# Patient Record
Sex: MALE | Race: WHITE
[De-identification: names, ages, dates, MRNs, and addresses within clinical notes are randomized per-mention and may not be internally consistent; named-entity substitution may affect disease eponyms.]

---

## 2020-02-10 ENCOUNTER — NURSE TRIAGE (OUTPATIENT)
Dept: OTHER | Facility: CLINIC | Age: 65
End: 2020-02-10

## 2020-02-10 NOTE — TELEPHONE ENCOUNTER
Reason for Disposition   Neurologic deficit of gradual onset, ANY of the following: * Weakness of the face, arm, or leg on one side of the body * Numbness of the face, arm, or leg on one side of the body * Loss of speech or garbled speech    Protocols used: NEUROLOGIC DEFICIT-ADULT-[place]    Patient states he has been waking with left leg numbness. He takes an Aleve and it helps somewhat. He does not have back pain or injury. Has mild numbness now. But when he wakes it is the worst.    He is asking where the best place to have a correct diagnosis would be. Recommend he be seen in office now, and if unable to get in to be seen, should go to urgent care or ER.